# Patient Record
Sex: MALE | Race: WHITE
[De-identification: names, ages, dates, MRNs, and addresses within clinical notes are randomized per-mention and may not be internally consistent; named-entity substitution may affect disease eponyms.]

---

## 2020-10-15 ENCOUNTER — HOSPITAL ENCOUNTER (OUTPATIENT)
Dept: HOSPITAL 95 - LAB SHORT | Age: 60
Discharge: HOME | End: 2020-10-15
Attending: NURSE PRACTITIONER
Payer: COMMERCIAL

## 2020-10-15 DIAGNOSIS — K92.1: Primary | ICD-10-CM

## 2020-10-15 DIAGNOSIS — R19.7: ICD-10-CM

## 2022-05-13 ENCOUNTER — HOSPITAL ENCOUNTER (OUTPATIENT)
Dept: HOSPITAL 95 - ORSCSDS | Age: 62
Discharge: HOME | End: 2022-05-13
Attending: INTERNAL MEDICINE
Payer: COMMERCIAL

## 2022-05-13 VITALS — WEIGHT: 296.74 LBS | BODY MASS INDEX: 40.19 KG/M2 | HEIGHT: 72 IN

## 2022-05-13 DIAGNOSIS — K64.8: ICD-10-CM

## 2022-05-13 DIAGNOSIS — R11.2: ICD-10-CM

## 2022-05-13 DIAGNOSIS — D12.3: ICD-10-CM

## 2022-05-13 DIAGNOSIS — K44.9: ICD-10-CM

## 2022-05-13 DIAGNOSIS — R10.13: ICD-10-CM

## 2022-05-13 DIAGNOSIS — Z86.010: ICD-10-CM

## 2022-05-13 DIAGNOSIS — Z87.891: ICD-10-CM

## 2022-05-13 DIAGNOSIS — K57.30: ICD-10-CM

## 2022-05-13 DIAGNOSIS — R13.14: ICD-10-CM

## 2022-05-13 DIAGNOSIS — K92.1: Primary | ICD-10-CM

## 2022-05-13 DIAGNOSIS — K22.10: ICD-10-CM

## 2022-05-13 PROCEDURE — 0DB78ZX EXCISION OF STOMACH, PYLORUS, VIA NATURAL OR ARTIFICIAL OPENING ENDOSCOPIC, DIAGNOSTIC: ICD-10-PCS | Performed by: INTERNAL MEDICINE

## 2022-05-13 PROCEDURE — 0D758ZZ DILATION OF ESOPHAGUS, VIA NATURAL OR ARTIFICIAL OPENING ENDOSCOPIC: ICD-10-PCS | Performed by: INTERNAL MEDICINE

## 2022-05-13 PROCEDURE — C1726 CATH, BAL DIL, NON-VASCULAR: HCPCS

## 2022-05-13 PROCEDURE — 0DBL8ZX EXCISION OF TRANSVERSE COLON, VIA NATURAL OR ARTIFICIAL OPENING ENDOSCOPIC, DIAGNOSTIC: ICD-10-PCS | Performed by: INTERNAL MEDICINE

## 2025-01-03 NOTE — NUR
Ambulatory in Day SurgeryPre-Op teaching done. Pt verbalizes understanding.
History, Chart, Medications and Allergies reviewed before start of
procedure.Patient confirms NPO status and agrees with scheduled surgery.
Patient reports completing Chlorhexadine shower X2 prior to admission to
hospital.

## 2025-01-03 NOTE — NUR
SHIFT SUMMARY
S/P FUNDOPLICATION.
PT TOLERATING CLEAR LIQUIDS AT THIS TIME. WANTED TO TRY FULL LIQUID DIET FOR
DINNER. EDUCATED PT ON TAKING HIS TIME EATING AND TO NOTIFY IF HE HAS ANY
NAUSEA. PT AGREEABLE. PAIN TOLERABLE AT THIS TIME. TITRATING OXYGEN DOWN. LAP
SITES REMAIN UNCHANGE SINCE ARRIVAL. SPOUSE AT BEDSIDE. PT DENIES FURTHER
NEEDS AT THIS TIME.

## 2025-01-03 NOTE — NUR
ARRIVAL TO UNIT
PT ARRIVED TO UNIT FROM PACU VIA GURNEY, SLID WITH SHEET. PT TOLERATED WELL.
CURRENTLY REPORTS NO PAIN. ASKED FOR WATER, TOLERATING SMALL SIPS WELL. SPOUSE
AT BEDSIDE. PT ON 2L NASAL CANULA, SATS 93%, CURRENTLY ATTEMPTING TO REST. LAP
SITES CDI. CALL LIGHT IN REACH, EDUCATED PT ON IMPORTANCE OF CALLING AT FIRST
SIGNS OF PAIN INCREASING.

## 2025-01-04 NOTE — NUR
SHIFT SUMMARY
POD 1 ASIA PARAESOPHAGELA HERNIA. NO ACUTE CHANGES OVERNIGHT. VSS, PT
HYPERTENSIVE POST-OP, MONITORING PER MD ORDERS. TOLERATING FULL LIQUID DIET.
AMB SBA R/T PAIN. VOIDING. PT REPORTS PAIN IN MID CHEST & ABD, STATES "IT
FEELS DIFFICULT TO TAKE A DEEP BREATH BECAUSE OF THE PAIN." PT EDUCATED c
RELAXATION TECHNIQUES, DEEP BREATHING ENCOURAGED. PT REPORTS PAIN TOLERABLE AT
THIS TIME, MEDICATED PER EMAR. PT EDUCATED ON SPLINTING TECHNIQUE WHEN
COUGHING. LAP SITE x4 C/D/I c MIN BRUISING. CALL LIGHT IN REACH, BED IN LOWEST
POSITION, WILL REPORT TO DAY RN.

## 2025-01-04 NOTE — NUR
DISCHARGE
PT AND HIS SPOUSE WERE PROVIDED WITH WRITTEN AND VERBAL DISCHARGE
INSTRUCTIONS; THEY REPORTED UNDERSTANDING.  AT TIME OF DISCHARGE PT HAS BEEN
ABLE TO TOLERATE PO, HE REPORTS HE HAS NOT PASSED FLATUS (DR. RICHMOND AWARE).  PT
ABLE TO AMBULATE IN THE [place].  PAIN MANAGED AT TIME OF DISCHARGE.  BP
SLIGHTLY ELEVATED 150/91, PT EDUCATED TO FOLLOW-UP WITH PCP REGARDING
BLOOD PRESSURE, PT REPORTS HE HAS NOT SEEN HIS PCP IN 2-3 YEARS.  PT AMBULATED
OUT INDEPENDENTLY AT 1411.

## 2025-01-04 NOTE — NUR
SPOKE WITH DR CRAIN REGARDING PTS BP WITH NO NEW ORDERS RECEIVED AND DR CRAIN ADVISED ME TO HAVE PTS NURSE CONTINUE TO MONITOR THROUGH THE NIGHT.

## 2025-01-04 NOTE — NUR
PT REPORTED SOME SHORTNESS OF BREATH UPON AM ASSESSMENT.  PT REPORTED SOME
CHEST MUSCLE SORENESS AS WELL.  PT REPORTS TAKING HIS HOME ALBUTEROL INHALER
WHILE IN THE HOSPITAL BUT STATES THE INHALER BROKE.  REQUESTED THAT PT NOT
TAKE ANY OF HIS HOME MEDICATIONS AND INSTEAD ALLOW HOSPITAL STAFF TO
ADMINISTER ALL MEDICATIONS.  ORDER OBTAINED FROM DR. CRAIN FOR ALBUTEROL
INHALER AT PT'S HOME DOSE.  RESPIRATORY THERAPY NOTIFIED OF ORDER.  PT REPORTS
SHORTNESS OF BREATH HAS IMPROVED AT THIS TIME.